# Patient Record
Sex: MALE | Race: AMERICAN INDIAN OR ALASKA NATIVE | NOT HISPANIC OR LATINO | URBAN - METROPOLITAN AREA
[De-identification: names, ages, dates, MRNs, and addresses within clinical notes are randomized per-mention and may not be internally consistent; named-entity substitution may affect disease eponyms.]

---

## 2020-07-25 ENCOUNTER — EMERGENCY (EMERGENCY)
Facility: HOSPITAL | Age: 30
LOS: 0 days | Discharge: HOME | End: 2020-07-26
Attending: EMERGENCY MEDICINE | Admitting: EMERGENCY MEDICINE
Payer: COMMERCIAL

## 2020-07-25 VITALS
DIASTOLIC BLOOD PRESSURE: 79 MMHG | TEMPERATURE: 98 F | RESPIRATION RATE: 18 BRPM | OXYGEN SATURATION: 99 % | SYSTOLIC BLOOD PRESSURE: 120 MMHG | HEART RATE: 74 BPM

## 2020-07-25 VITALS
RESPIRATION RATE: 18 BRPM | TEMPERATURE: 99 F | OXYGEN SATURATION: 100 % | DIASTOLIC BLOOD PRESSURE: 90 MMHG | HEART RATE: 71 BPM | SYSTOLIC BLOOD PRESSURE: 128 MMHG

## 2020-07-25 DIAGNOSIS — Z20.828 CONTACT WITH AND (SUSPECTED) EXPOSURE TO OTHER VIRAL COMMUNICABLE DISEASES: ICD-10-CM

## 2020-07-25 DIAGNOSIS — R10.9 UNSPECIFIED ABDOMINAL PAIN: ICD-10-CM

## 2020-07-25 DIAGNOSIS — N20.0 CALCULUS OF KIDNEY: ICD-10-CM

## 2020-07-25 LAB
ALBUMIN SERPL ELPH-MCNC: 5.3 G/DL — HIGH (ref 3.5–5.2)
ALP SERPL-CCNC: 82 U/L — SIGNIFICANT CHANGE UP (ref 30–115)
ALT FLD-CCNC: 21 U/L — SIGNIFICANT CHANGE UP (ref 0–41)
ANION GAP SERPL CALC-SCNC: 14 MMOL/L — SIGNIFICANT CHANGE UP (ref 7–14)
APPEARANCE UR: CLEAR — SIGNIFICANT CHANGE UP
AST SERPL-CCNC: 19 U/L — SIGNIFICANT CHANGE UP (ref 0–41)
BACTERIA # UR AUTO: NEGATIVE — SIGNIFICANT CHANGE UP
BASOPHILS # BLD AUTO: 0.06 K/UL — SIGNIFICANT CHANGE UP (ref 0–0.2)
BASOPHILS NFR BLD AUTO: 0.9 % — SIGNIFICANT CHANGE UP (ref 0–1)
BILIRUB SERPL-MCNC: 0.4 MG/DL — SIGNIFICANT CHANGE UP (ref 0.2–1.2)
BILIRUB UR-MCNC: NEGATIVE — SIGNIFICANT CHANGE UP
BUN SERPL-MCNC: 14 MG/DL — SIGNIFICANT CHANGE UP (ref 10–20)
CALCIUM SERPL-MCNC: 9.7 MG/DL — SIGNIFICANT CHANGE UP (ref 8.5–10.1)
CHLORIDE SERPL-SCNC: 99 MMOL/L — SIGNIFICANT CHANGE UP (ref 98–110)
CO2 SERPL-SCNC: 24 MMOL/L — SIGNIFICANT CHANGE UP (ref 17–32)
COLOR SPEC: COLORLESS — SIGNIFICANT CHANGE UP
CREAT SERPL-MCNC: 1 MG/DL — SIGNIFICANT CHANGE UP (ref 0.7–1.5)
DIFF PNL FLD: ABNORMAL
EOSINOPHIL # BLD AUTO: 0.19 K/UL — SIGNIFICANT CHANGE UP (ref 0–0.7)
EOSINOPHIL NFR BLD AUTO: 2.9 % — SIGNIFICANT CHANGE UP (ref 0–8)
EPI CELLS # UR: 0 /HPF — SIGNIFICANT CHANGE UP (ref 0–5)
GLUCOSE SERPL-MCNC: 97 MG/DL — SIGNIFICANT CHANGE UP (ref 70–99)
GLUCOSE UR QL: NEGATIVE — SIGNIFICANT CHANGE UP
HCT VFR BLD CALC: 42.7 % — SIGNIFICANT CHANGE UP (ref 42–52)
HGB BLD-MCNC: 14.9 G/DL — SIGNIFICANT CHANGE UP (ref 14–18)
HYALINE CASTS # UR AUTO: 0 /LPF — SIGNIFICANT CHANGE UP (ref 0–7)
IMM GRANULOCYTES NFR BLD AUTO: 0.5 % — HIGH (ref 0.1–0.3)
KETONES UR-MCNC: NEGATIVE — SIGNIFICANT CHANGE UP
LACTATE SERPL-SCNC: 0.9 MMOL/L — SIGNIFICANT CHANGE UP (ref 0.7–2)
LEUKOCYTE ESTERASE UR-ACNC: NEGATIVE — SIGNIFICANT CHANGE UP
LIDOCAIN IGE QN: 29 U/L — SIGNIFICANT CHANGE UP (ref 7–60)
LYMPHOCYTES # BLD AUTO: 2.99 K/UL — SIGNIFICANT CHANGE UP (ref 1.2–3.4)
LYMPHOCYTES # BLD AUTO: 45 % — SIGNIFICANT CHANGE UP (ref 20.5–51.1)
MCHC RBC-ENTMCNC: 28.4 PG — SIGNIFICANT CHANGE UP (ref 27–31)
MCHC RBC-ENTMCNC: 34.9 G/DL — SIGNIFICANT CHANGE UP (ref 32–37)
MCV RBC AUTO: 81.5 FL — SIGNIFICANT CHANGE UP (ref 80–94)
MONOCYTES # BLD AUTO: 0.47 K/UL — SIGNIFICANT CHANGE UP (ref 0.1–0.6)
MONOCYTES NFR BLD AUTO: 7.1 % — SIGNIFICANT CHANGE UP (ref 1.7–9.3)
NEUTROPHILS # BLD AUTO: 2.91 K/UL — SIGNIFICANT CHANGE UP (ref 1.4–6.5)
NEUTROPHILS NFR BLD AUTO: 43.6 % — SIGNIFICANT CHANGE UP (ref 42.2–75.2)
NITRITE UR-MCNC: NEGATIVE — SIGNIFICANT CHANGE UP
PH UR: 6 — SIGNIFICANT CHANGE UP (ref 5–8)
PLATELET # BLD AUTO: 232 K/UL — SIGNIFICANT CHANGE UP (ref 130–400)
POTASSIUM SERPL-MCNC: 4.1 MMOL/L — SIGNIFICANT CHANGE UP (ref 3.5–5)
POTASSIUM SERPL-SCNC: 4.1 MMOL/L — SIGNIFICANT CHANGE UP (ref 3.5–5)
PROT SERPL-MCNC: 7.7 G/DL — SIGNIFICANT CHANGE UP (ref 6–8)
PROT UR-MCNC: NEGATIVE — SIGNIFICANT CHANGE UP
RBC # BLD: 5.24 M/UL — SIGNIFICANT CHANGE UP (ref 4.7–6.1)
RBC # FLD: 11.8 % — SIGNIFICANT CHANGE UP (ref 11.5–14.5)
RBC CASTS # UR COMP ASSIST: 1 /HPF — SIGNIFICANT CHANGE UP (ref 0–4)
SODIUM SERPL-SCNC: 137 MMOL/L — SIGNIFICANT CHANGE UP (ref 135–146)
SP GR SPEC: 1.01 — LOW (ref 1.01–1.02)
UROBILINOGEN FLD QL: SIGNIFICANT CHANGE UP
WBC # BLD: 6.65 K/UL — SIGNIFICANT CHANGE UP (ref 4.8–10.8)
WBC # FLD AUTO: 6.65 K/UL — SIGNIFICANT CHANGE UP (ref 4.8–10.8)
WBC UR QL: 2 /HPF — SIGNIFICANT CHANGE UP (ref 0–5)

## 2020-07-25 PROCEDURE — 74177 CT ABD & PELVIS W/CONTRAST: CPT | Mod: 26

## 2020-07-25 PROCEDURE — 99283 EMERGENCY DEPT VISIT LOW MDM: CPT

## 2020-07-25 PROCEDURE — 99285 EMERGENCY DEPT VISIT HI MDM: CPT

## 2020-07-25 RX ORDER — KETOROLAC TROMETHAMINE 30 MG/ML
15 SYRINGE (ML) INJECTION ONCE
Refills: 0 | Status: DISCONTINUED | OUTPATIENT
Start: 2020-07-25 | End: 2020-07-25

## 2020-07-25 RX ORDER — TAMSULOSIN HYDROCHLORIDE 0.4 MG/1
0.4 CAPSULE ORAL AT BEDTIME
Refills: 0 | Status: DISCONTINUED | OUTPATIENT
Start: 2020-07-25 | End: 2020-07-26

## 2020-07-25 RX ORDER — SODIUM CHLORIDE 9 MG/ML
1000 INJECTION INTRAMUSCULAR; INTRAVENOUS; SUBCUTANEOUS ONCE
Refills: 0 | Status: COMPLETED | OUTPATIENT
Start: 2020-07-25 | End: 2020-07-25

## 2020-07-25 RX ORDER — SODIUM CHLORIDE 9 MG/ML
1000 INJECTION, SOLUTION INTRAVENOUS ONCE
Refills: 0 | Status: COMPLETED | OUTPATIENT
Start: 2020-07-25 | End: 2020-07-25

## 2020-07-25 RX ADMIN — SODIUM CHLORIDE 1000 MILLILITER(S): 9 INJECTION, SOLUTION INTRAVENOUS at 22:54

## 2020-07-25 RX ADMIN — Medication 15 MILLIGRAM(S): at 21:47

## 2020-07-25 RX ADMIN — TAMSULOSIN HYDROCHLORIDE 0.4 MILLIGRAM(S): 0.4 CAPSULE ORAL at 22:54

## 2020-07-25 RX ADMIN — SODIUM CHLORIDE 1000 MILLILITER(S): 9 INJECTION INTRAMUSCULAR; INTRAVENOUS; SUBCUTANEOUS at 14:51

## 2020-07-25 NOTE — ED PROVIDER NOTE - SHIFT CHANGE DETAILS
29 y/o m w/ no pmhx presented for LLQ pain x 3 months, worsened yesterday evening, no testicular pain/swelling/erythema, labs with white count of 6.68. bun/cre 14/1.0, small micro blood in urine, ct a/p showing "Obstructing left distal ureteral stone measuring 1.3 x 1.0 x 0.7 cm with associated at least moderate upstream left hydroureteronephrosis." received 1 liter fo fluids, toradol, flomax, pt comfortable at this time, urology aware of pt, pt aware of plan, will continue to monitor and reassess.

## 2020-07-25 NOTE — ED PROVIDER NOTE - PATIENT PORTAL LINK FT
You can access the FollowMyHealth Patient Portal offered by [facility] by registering at the following website: [email or web address]. By joining [employer]’s FollowMyHealth portal, you will also be able to view your health information using other applications (apps) compatible with our system.

## 2020-07-25 NOTE — ED PROVIDER NOTE - NSFOLLOWUPCLINICS_GEN_ALL_ED_FT
[facility]  Urology  .  [place]   Phone: [phone]  Fax:   Follow Up Time:

## 2020-07-25 NOTE — ED ADULT NURSE NOTE - CHPI ED NUR SYMPTOMS NEG
no vomiting/no blood in stool/no burning urination/no nausea/no dysuria/no hematuria/no fever/no abdominal distension/no diarrhea/no chills

## 2020-07-25 NOTE — ED PROVIDER NOTE - ATTENDING CONTRIBUTION TO CARE
29 yo m with no pmh, presents with LLQ pain x 3 months.  pt says worsened last night.  pt denies heavy lifting.  no urinary sx, no n/v/d, no constipation, and passing gas.  no h/o surgeries.  pt says that he f/u with his pmd for the pain in the past and was sent for an outpt US-looking for kidney stones.  pt says he was told was neg.  pt denies testicular pain or swelling.  no fever, no chills.  exam: nad, ncat, perrl, eomi, mmm, rrr, ctab, abd soft, nt,nd aox3,  exam as per shana caicedo imp: pt with possible L inguinal hernia, will check labs and CT, no concern for obstruction at this time

## 2020-07-25 NOTE — ED PROVIDER NOTE - CLINICAL SUMMARY MEDICAL DECISION MAKING FREE TEXT BOX
pt feeling better, tolerated po, abd re exam soft ntnd, no r/g, pt aware of imaging results, pain controlled, urology evaluated pt, report follow up on Monday with Urology, Dr. Barrios, possible procedure at [facility] on Tuesday, pt reports he understands, aware of signs and symptoms to return for, will give copy of results as reviewed and understood.

## 2020-07-25 NOTE — ED PROVIDER NOTE - CARE PROVIDER_API CALL
Krysta Conde  Urology  [address] [place] [address] [address] [place] [address]  Phone: [phone]  Fax: [phone]  Follow Up Time:

## 2020-07-25 NOTE — ED PROVIDER NOTE - PROGRESS NOTE DETAILS
spoke with urology - will see pt ED Attending SHERWIN Rivero Dr. evaluated pt, reports COVID swab, follow up in office on Monday, if negative will see pt at [facility] on Tuesday for possible procedure, pt aware and agrees with plan, will swab, no white count, no fever, normal bun/cre pain controlled at this time.

## 2020-07-25 NOTE — ED PROVIDER NOTE - OBJECTIVE STATEMENT
30 year old male no past medical history p/w abd pain. pain llq, no pall/prov factors, radiating to groin, x 3 months. Patient underwent mri with negative reuslts as outpatient. pain mostly resolved for 2 months but returned in greater severity last night. No fevers, chills, n/v/d, testicular pain. no heavy lifting 30 year old male no past medical history p/w abd pain. pain llq, no pall/prov factors, radiating to groin, x 3 months. Patient underwent ultrasound which with negative as per patient 2 months ago. pain mostly resolved for 2 months but returned in greater severity last night. No fevers, chills, n/v/d, testicular pain. no heavy lifting or trauma.

## 2020-07-25 NOTE — ED PROVIDER NOTE - PHYSICAL EXAMINATION
Physical Exam    Vital Signs: I have reviewed the initial vital signs  Constitutional: well-nourished, appears stated age, no acute distress  Cardiovascular: S1 and S2 present, regular rate, regular rhythm. Well perfused extremities, no peripheral edema  Respiratory: unlabored respiratory effort, clear to auscultation bilaterally no wheezing, rales or rhonchi  Gastrointestinal: soft, non-tender abdomen. No guarding or rebound tenderness  Musculoskeletal: supple nontender neck, no midline tenderness, no joint pain  Genital: Uncircumcised male, no discharge from meatus, no rash, lesions, or sores noted. + impulse on left hernia exam, small. Testes normal size, nontender, cremaster intact. No epididymal tenderness.  Integumentary: warm, dry, no rash  Psychiatric: appropriate mood, appropriate affect

## 2020-07-25 NOTE — ED ADULT NURSE NOTE - OBJECTIVE STATEMENT
Patient c/o left sided abdominal pain x 3 months patient states over the last 2 days pain increased . Denies urinary symptoms/nausea/vomiting/diarrhea/SOB/CP/fever/chills. On assessment abdomen soft/nontender/nondistended. No s/s of distress noted.

## 2020-07-26 DIAGNOSIS — N13.2 HYDRONEPHROSIS WITH RENAL AND URETERAL CALCULOUS OBSTRUCTION: ICD-10-CM

## 2020-07-26 LAB
CULTURE RESULTS: SIGNIFICANT CHANGE UP
SARS-COV-2 RNA SPEC QL NAA+PROBE: SIGNIFICANT CHANGE UP
SPECIMEN SOURCE: SIGNIFICANT CHANGE UP

## 2020-07-26 RX ORDER — KETOROLAC TROMETHAMINE 30 MG/ML
1 SYRINGE (ML) INJECTION
Qty: 5 | Refills: 0
Start: 2020-07-26 | End: 2020-07-30

## 2020-07-26 RX ORDER — TAMSULOSIN HYDROCHLORIDE 0.4 MG/1
1 CAPSULE ORAL
Qty: 5 | Refills: 0
Start: 2020-07-26 | End: 2020-07-30

## 2020-07-26 NOTE — CONSULT NOTE ADULT - SUBJECTIVE AND OBJECTIVE BOX
Patient is a 30y old  Male who presents with a chief complaint of Left sided groin pain x 3 months which has worsened over past 24hrs. Denies fever, chills, nausea or vomiting. No dysuria          PAST MEDICAL & SURGICAL HISTORY:  No pertinent past medical history  No significant past surgical history      REVIEW OF SYSTEMS:    CONSTITUTIONAL:  fevers or chills  HEENT: No visual changes  ENDO: No sweating  NECK: No pain or stiffness  MUSCULOSKELETAL: No back pain, no joint pain  RESPIRATORY: No shortness of breath  CARDIOVASCULAR: No chest pain  GASTROINTESTINAL: No abdominal or epigastric pain. No nausea, vomiting,  No diarrhea or constipation.   NEUROLOGICAL: No mental status changes  PSYCH: No depression, no mood changes  SKIN: No itching      MEDICATIONS  (STANDING):  tamsulosin 0.4 milliGRAM(s) Oral at bedtime  :      Allergies    No Known Allergies            SOCIAL HISTORY: No illicit drug use    FAMILY HISTORY:  NA    Vital Signs Last 24 Hrs  T(C): 37.2 (2020 16:52), Max: 37.2 (2020 16:52)  T(F): 98.9 (2020 16:52), Max: 98.9 (2020 16:52)  HR: 71 (2020 16:52) (71 - 74)  BP: 128/90 (2020 16:52) (120/79 - 128/90)  BP(mean): --  RR: 18 (2020 16:52) (18 - 18)  SpO2: 100% (2020 16:52) (99% - 100%)    PHYSICAL EXAM:    Constitutional: NAD, well-developed  HEENT: EOMI  Neck: no pain  Back: No CVA tenderness  Respiratory: No accessory respiratory muscle use  Abd: Soft, NT/ND  no organomegally  no hernia, mild left lower quadrant tenderness adjacent to bladder  : Normal phallus without any testicular tenderness or masses. No scrotal edema  WALKER: NA  Extremities: no edema  Neurological: A/O x 3  Psychiatric: Normal mood, normal affect  Skin: No rashes    I&O's Summary      LABS:                        14.9   6.65  )-----------( 232      ( 2020 15:09 )             42.7     07-25    137  |  99  |  14  ----------------------------<  97  4.1   |  24  |  1.0    Ca    9.7      2020 15:09    TPro  7.7  /  Alb  5.3<H>  /  TBili  0.4  /  DBili  x   /  AST  19  /  ALT  21  /  AlkPhos  82  07-25      Urinalysis Basic - ( 2020 16:33 )    Color: Colorless / Appearance: Clear / S.009 / pH: x  Gluc: x / Ketone: Negative  / Bili: Negative / Urobili: <2 mg/dL   Blood: x / Protein: Negative / Nitrite: Negative   Leuk Esterase: Negative / RBC: 1 /HPF / WBC 2 /HPF   Sq Epi: x / Non Sq Epi: 0 /HPF / Bacteria: Negative              RADIOLOGY & ADDITIONAL STUDIES:  < from: CT Abdomen and Pelvis w/ IV Cont (20 @ 20:02) >  IMPRESSION:    Obstructing left distal ureteral stone measuring 1.3 x 1.0 x 0.7 cm (1100 HU) with associated at least moderate upstream left hydroureteronephrosis.    < end of copied text > Patient is a 30y old  Male who presents with a chief complaint of Left sided groin pain x 3 months which has worsened over past 24hrs. Denies fever, chills, nausea or vomiting. No dysuria          PAST MEDICAL & SURGICAL HISTORY:  No pertinent past medical history  No significant past surgical history      REVIEW OF SYSTEMS:    CONSTITUTIONAL:  fevers or chills  HEENT: No visual changes  ENDO: No sweating  NECK: No pain or stiffness  MUSCULOSKELETAL: No back pain, no joint pain  RESPIRATORY: No shortness of breath  CARDIOVASCULAR: No chest pain  GASTROINTESTINAL: No abdominal or epigastric pain. No nausea, vomiting,  No diarrhea or constipation.   NEUROLOGICAL: No mental status changes  PSYCH: No depression, no mood changes  SKIN: No itching      MEDICATIONS  (STANDING):  tamsulosin 0.4 milliGRAM(s) Oral at bedtime  :      Allergies    No Known Allergies            SOCIAL HISTORY: No illicit drug use    FAMILY HISTORY: denies stones      Vital Signs Last 24 Hrs  T(C): 37.2 (2020 16:52), Max: 37.2 (2020 16:52)  T(F): 98.9 (2020 16:52), Max: 98.9 (2020 16:52)  HR: 71 (2020 16:52) (71 - 74)  BP: 128/90 (2020 16:52) (120/79 - 128/90)    RR: 18 (2020 16:52) (18 - 18)  SpO2: 100% (2020 16:52) (99% - 100%)    PHYSICAL EXAM:    Constitutional: NAD, well-developed  HEENT: EOMI  Neck: no pain  Back: No CVA tenderness  Respiratory: No accessory respiratory muscle use  Abd: Soft, NT/ND  no organomegally  no hernia, mild left lower quadrant tenderness adjacent to bladder  : Normal phallus without any testicular tenderness or masses. No scrotal edema  WALKER: NA  Extremities: no edema  Neurological: A/O x 3  Psychiatric: Normal mood, normal affect  Skin: No rashes    I&O's Summary      LABS:                        14.9   6.65  )-----------( 232      ( 2020 15:09 )             42.7     07-25    137  |  99  |  14  ----------------------------<  97  4.1   |  24  |  1.0    Ca    9.7      2020 15:09    TPro  7.7  /  Alb  5.3<H>  /  TBili  0.4  /  DBili  x   /  AST  19  /  ALT  21  /  AlkPhos  82  07-      Urinalysis Basic - ( 2020 16:33 )    Color: Colorless / Appearance: Clear / S.009 / pH: x  Gluc: x / Ketone: Negative  / Bili: Negative / Urobili: <2 mg/dL   Blood: x / Protein: Negative / Nitrite: Negative   Leuk Esterase: Negative / RBC: 1 /HPF / WBC 2 /HPF   Sq Epi: x / Non Sq Epi: 0 /HPF / Bacteria: Negative              RADIOLOGY & ADDITIONAL STUDIES:  < from: CT Abdomen and Pelvis w/ IV Cont (20 @ 20:02) >  IMPRESSION:    Obstructing left distal ureteral stone measuring 1.3 x 1.0 x 0.7 cm (1100 HU) with associated at least moderate upstream left hydroureteronephrosis.    < end of copied text >

## 2020-07-26 NOTE — CONSULT NOTE ADULT - PROBLEM SELECTOR RECOMMENDATION 9
Pt seen and examined by Dr Conde  Pt is pain free at this time, VSS and afebrile  Please Obtain COVID-19 test so that pt can be scheduled for outpt Laser lithotripsy.   Pt should f/u with Dr Conde on monday 7/27/20 to discuss surgical time and date

## 2020-07-26 NOTE — CONSULT NOTE ADULT - ASSESSMENT
Left distal ureteral calculus with mild hydronephrosis  No pain at this time Left distal ureteral calculus (1.3 x 1.0 x 0.7 cm (1100 HU))with mild hydronephrosis  No pain at this time    I reviewed the options with the patient. We will have a Covid test release 24 hours, he’s feeling fine and I recommended he go home come to my office Monday morning and we will schedule him for cystoscopy. If we can get up to the stone and there is no cloudy urine we will try and do ureteroscopy laser lithotripsy. If the ureter is tight horrifying cloudy urine we will put up a double J and then bring him back a week or so later. He accept the recommendations and will go home.

## 2020-07-26 NOTE — CONSULT NOTE ADULT - ATTENDING COMMENTS
I personally saw and examined the patient, reviewed the chart and available data. I discussed the situation with the patient and  as well as ER staff. I also reviewed and/or amended the note as necessary. I personally saw and examined the patient, reviewed the chart and available data. I discussed the situation with the patient and  as well as ER staff. I also reviewed and/or amended the note as necessary.    patient seen on the day in question. Note not reviewed and cosigned until now due to scheduling issues

## 2020-07-27 ENCOUNTER — APPOINTMENT (OUTPATIENT)
Dept: UROLOGY | Facility: CLINIC | Age: 30
End: 2020-07-27
Payer: COMMERCIAL

## 2020-07-27 VITALS
DIASTOLIC BLOOD PRESSURE: 88 MMHG | HEIGHT: 64 IN | TEMPERATURE: 97.9 F | WEIGHT: 124.2 LBS | HEART RATE: 75 BPM | SYSTOLIC BLOOD PRESSURE: 135 MMHG | BODY MASS INDEX: 21.21 KG/M2

## 2020-07-27 DIAGNOSIS — Z78.9 OTHER SPECIFIED HEALTH STATUS: ICD-10-CM

## 2020-07-27 DIAGNOSIS — E78.00 PURE HYPERCHOLESTEROLEMIA, UNSPECIFIED: ICD-10-CM

## 2020-07-27 PROBLEM — Z00.00 ENCOUNTER FOR PREVENTIVE HEALTH EXAMINATION: Status: ACTIVE | Noted: 2020-07-27

## 2020-07-27 PROCEDURE — 99215 OFFICE O/P EST HI 40 MIN: CPT | Mod: 25

## 2020-07-28 ENCOUNTER — APPOINTMENT (OUTPATIENT)
Dept: UROLOGY | Facility: HOSPITAL | Age: 30
End: 2020-07-28
Payer: COMMERCIAL

## 2020-07-28 ENCOUNTER — OUTPATIENT (OUTPATIENT)
Dept: OUTPATIENT SERVICES | Facility: HOSPITAL | Age: 30
LOS: 1 days | Discharge: HOME | End: 2020-07-28

## 2020-07-28 VITALS
TEMPERATURE: 99 F | DIASTOLIC BLOOD PRESSURE: 82 MMHG | SYSTOLIC BLOOD PRESSURE: 120 MMHG | OXYGEN SATURATION: 100 % | RESPIRATION RATE: 18 BRPM | HEIGHT: 64 IN | WEIGHT: 125 LBS | HEART RATE: 103 BPM

## 2020-07-28 VITALS
SYSTOLIC BLOOD PRESSURE: 113 MMHG | RESPIRATION RATE: 16 BRPM | DIASTOLIC BLOOD PRESSURE: 77 MMHG | TEMPERATURE: 97 F | OXYGEN SATURATION: 100 % | HEART RATE: 73 BPM

## 2020-07-28 PROCEDURE — 52356 CYSTO/URETERO W/LITHOTRIPSY: CPT | Mod: LT

## 2020-07-28 RX ORDER — OXYCODONE AND ACETAMINOPHEN 5; 325 MG/1; MG/1
1 TABLET ORAL ONCE
Refills: 0 | Status: DISCONTINUED | OUTPATIENT
Start: 2020-07-28 | End: 2020-07-28

## 2020-07-28 RX ORDER — ONDANSETRON 8 MG/1
4 TABLET, FILM COATED ORAL ONCE
Refills: 0 | Status: DISCONTINUED | OUTPATIENT
Start: 2020-07-28 | End: 2020-08-12

## 2020-07-28 RX ORDER — MEPERIDINE HYDROCHLORIDE 50 MG/ML
12.5 INJECTION INTRAMUSCULAR; INTRAVENOUS; SUBCUTANEOUS ONCE
Refills: 0 | Status: DISCONTINUED | OUTPATIENT
Start: 2020-07-28 | End: 2020-07-28

## 2020-07-28 RX ORDER — MORPHINE SULFATE 50 MG/1
2 CAPSULE, EXTENDED RELEASE ORAL
Refills: 0 | Status: DISCONTINUED | OUTPATIENT
Start: 2020-07-28 | End: 2020-07-28

## 2020-07-28 RX ORDER — SODIUM CHLORIDE 9 MG/ML
1000 INJECTION, SOLUTION INTRAVENOUS
Refills: 0 | Status: DISCONTINUED | OUTPATIENT
Start: 2020-07-28 | End: 2020-08-12

## 2020-07-28 RX ORDER — MORPHINE SULFATE 50 MG/1
4 CAPSULE, EXTENDED RELEASE ORAL
Refills: 0 | Status: DISCONTINUED | OUTPATIENT
Start: 2020-07-28 | End: 2020-07-28

## 2020-07-28 RX ADMIN — OXYCODONE AND ACETAMINOPHEN 1 TABLET(S): 5; 325 TABLET ORAL at 20:00

## 2020-07-28 RX ADMIN — MORPHINE SULFATE 4 MILLIGRAM(S): 50 CAPSULE, EXTENDED RELEASE ORAL at 19:35

## 2020-07-28 RX ADMIN — MORPHINE SULFATE 2 MILLIGRAM(S): 50 CAPSULE, EXTENDED RELEASE ORAL at 19:10

## 2020-07-28 NOTE — HISTORY OF PRESENT ILLNESS
[Lower Abdomen] : lower abdomen [2] : 2 [None] : There is no radiation [FreeTextEntry1] : Sharath is a 30-year-old man from [facility] there was seen in the ER over the weekend with a left ureteral stone that he thinks has been there since March 2020. He had some big abdominal pain and an MRI and sono were done by his PCP and they were none diagnostic. On Saturday he developed some increased dysethesia on the left side the CAT scan was positive for 13 x 10 x 7 mm stone I saw him he was comfortable he went home to me today to discuss his options. [de-identified] : discomfort right SI joint

## 2020-07-28 NOTE — ASSESSMENT
[FreeTextEntry1] : He has a large rather dense left distal ureter stone and for what he said is been there at least three months. I discussed within the options I don’t think shockwave lithotripsy will be of value. We will try and get up to work with the ureter scope hoping that will we bypass it with the wire we don’t get cloudy flecks. If we can get up to in the urine is clear will try to break it up with the laser beam take out the pieces only the double J. If we are the cake it up to it – the ureter is tight, or your is cloudy will put up a double J and come back another day. The risk benefits and alternatives were discussed at length in all questions were answered

## 2020-07-28 NOTE — PHYSICAL EXAM
[General Appearance - Well Developed] : well developed [Well Groomed] : well groomed [General Appearance - Well Nourished] : well nourished [Normal Appearance] : normal appearance [Abdomen Soft] : soft [General Appearance - In No Acute Distress] : no acute distress [Costovertebral Angle Tenderness] : no ~M costovertebral angle tenderness [Abdomen Tenderness] : non-tender [Urethral Meatus] : meatus normal [Penis Abnormality] : normal uncircumcised penis [Testes Tenderness] : no tenderness of the testes [Edema] : no peripheral edema [Respiration, Rhythm And Depth] : normal respiratory rhythm and effort [] : no respiratory distress [Exaggerated Use Of Accessory Muscles For Inspiration] : no accessory muscle use [Oriented To Time, Place, And Person] : oriented to person, place, and time [Mood] : the mood was normal [Not Anxious] : not anxious [Affect] : the affect was normal [Normal Station and Gait] : the gait and station were normal for the patient's age [No Focal Deficits] : no focal deficits [FreeTextEntry1] : (from ER saturday night)

## 2020-07-28 NOTE — REVIEW OF SYSTEMS
[Negative] : Endocrine [Joint Pain] : joint pain [FreeTextEntry1] : right si joint unrelated to current issues

## 2020-07-28 NOTE — LETTER HEADER
[FreeTextEntry3] : Krysta Conde M.D.\par Director of Urology\par [facility]/[place]\par [address] [place] [address], [facility] [address]\[place] [address]\par

## 2020-07-28 NOTE — ASU DISCHARGE PLAN (ADULT/PEDIATRIC) - CALL YOUR DOCTOR IF YOU HAVE ANY OF THE FOLLOWING:
Increased irritability or sluggishness/Bleeding that does not stop/Pain not relieved by Medications/Fever greater than (need to indicate Fahrenheit or Celsius)/Nausea and vomiting that does not stop/Inability to tolerate liquids or foods

## 2020-07-28 NOTE — ASU DISCHARGE PLAN (ADULT/PEDIATRIC) - CARE PROVIDER_API CALL
Krysta Conde  Urology  [address] [place] [address] [address] [place] [address]  Phone: [phone]  Fax: [phone]  Established Patient  Follow Up Time:

## 2020-07-28 NOTE — CHART NOTE - NSCHARTNOTEFT_GEN_A_CORE
PACU ANESTHESIA ADMISSION NOTE      Procedure: Insertion, stent, ureter, cystoscopic    Post op diagnosis:  Left ureteral stone  Pyonephrosis      ____  Intubated  TV:______       Rate: ______      FiO2: ______    _x___  Patent Airway    _x___  Full return of protective reflexes    _x___  Full recovery from anesthesia / back to baseline status    Vitals:  T(C): 37.2  HR 75  BP:103/66   RR: 18   SpO2: 100%(    Mental Status:  _x___ Awake   _____ Alert   _____ Drowsy   _____ Sedated    Nausea/Vomiting:  _x___  NO       ______Yes,   See Post - Op Orders         Pain Scale (0-10):  __0___    Treatment: _x___ None    ____ See Post - Op/PCA Orders    Post - Operative Fluids:   __x__ Oral   ____ See Post - Op Orders    Plan: Discharge:   __x __Home       _____Floor     _____Critical Care    _____  Other:_________________    Comments:  No anesthesia issues or complications noted.  Discharge when criteria met.

## 2020-07-28 NOTE — BRIEF OPERATIVE NOTE - NSICDXBRIEFPREOP_GEN_ALL_CORE_FT
PRE-OP DIAGNOSIS:  Left ureteral stone 28-Jul-2020 18:59:39  Krysta Conde  Pyonephrosis 28-Jul-2020 18:59:33  Krysta Conde PRE-OP DIAGNOSIS:  Left ureteral stone 28-Jul-2020 18:59:39  Krysta Conde

## 2020-07-28 NOTE — BRIEF OPERATIVE NOTE - NSICDXBRIEFPOSTOP_GEN_ALL_CORE_FT
POST-OP DIAGNOSIS:  Left ureteral stone 28-Jul-2020 19:00:25  Krysta Conde  Pyonephrosis 28-Jul-2020 19:00:15  Krysta Conde

## 2020-07-29 ENCOUNTER — APPOINTMENT (OUTPATIENT)
Dept: UROLOGY | Facility: CLINIC | Age: 30
End: 2020-07-29
Payer: COMMERCIAL

## 2020-07-29 VITALS
DIASTOLIC BLOOD PRESSURE: 71 MMHG | SYSTOLIC BLOOD PRESSURE: 116 MMHG | HEIGHT: 64 IN | TEMPERATURE: 99.6 F | BODY MASS INDEX: 21.17 KG/M2 | WEIGHT: 124 LBS | HEART RATE: 93 BPM

## 2020-07-29 PROBLEM — E78.00 PURE HYPERCHOLESTEROLEMIA, UNSPECIFIED: Chronic | Status: ACTIVE | Noted: 2020-07-28

## 2020-07-29 LAB
GRAM STN FLD: SIGNIFICANT CHANGE UP
SPECIMEN SOURCE: SIGNIFICANT CHANGE UP

## 2020-07-29 PROCEDURE — 99214 OFFICE O/P EST MOD 30 MIN: CPT

## 2020-07-30 LAB
CULTURE RESULTS: NO GROWTH — SIGNIFICANT CHANGE UP
SPECIMEN SOURCE: SIGNIFICANT CHANGE UP

## 2020-07-30 NOTE — REVIEW OF SYSTEMS
[see HPI] : see HPI [Joint Pain] : joint pain [Negative] : Heme/Lymph [Fever] : no fever [Chills] : no chills [FreeTextEntry1] : right si joint unrelated to current issues

## 2020-07-30 NOTE — ADDENDUM
[FreeTextEntry1] : July 30, 2020 the culture came back as no growth, While the Gram stain showed few white cells no bacteria seen\par

## 2020-07-30 NOTE — HISTORY OF PRESENT ILLNESS
[6] : 6 [Lower Abdomen] : lower abdomen [None] : There is no radiation [Constant] : constantly [Dysuria] : dysuria [Left Flank] : left flank [Abdominal Pain] : abdominal pain [de-identified] : since post op [FreeTextEntry1] : Sharath went to the OR last night hoping to get ureteroscopic removal of left ureteral stone. However the anatomy was way too tight and we also data cloudy urine so we put up a double J in because of the cloudy urine I wanted to promote distal flow I left him with a Zhang. He went home with the catheter. He comes in this morning telling me that he’s in agony from the a catheter. He is having a lot of pain in the tip of his penis in his belly just hurts terribly.\par  He said no fever and the urine has been clear.\par  [de-identified] : alejandro out

## 2020-07-30 NOTE — LETTER HEADER
[FreeTextEntry3] : Krysta Conde M.D.\par Director of Urology\par [facility]/[place]\par [address] [place] [address], [facility] [address]\[place] [address]\par

## 2020-07-30 NOTE — ASSESSMENT
[FreeTextEntry1] : Some of this is just the ureter is irritated by the catheter though I did put in a 14 [place] which is really the smallest identity using adults. However he is a small person, the ureter was quite tight let alone the stone being impacted making it even worse. So we could be having some stent colic. I took out the catheter, he felt a lot better but still uncomfortable and unfortunately will have to live with that. He will stay on the Flomax and we are putting him on for a second attempt for next Tuesday.

## 2020-07-30 NOTE — PHYSICAL EXAM
[General Appearance - Well Developed] : well developed [General Appearance - Well Nourished] : well nourished [Normal Appearance] : normal appearance [Well Groomed] : well groomed [Abdomen Soft] : soft [Costovertebral Angle Tenderness] : no ~M costovertebral angle tenderness [Abdomen Tenderness] : non-tender [Urethral Meatus] : meatus normal [Penis Abnormality] : normal uncircumcised penis [Epididymis] : the epididymides were normal [Testes Tenderness] : no tenderness of the testes [Heart Rate And Rhythm] : Heart rate and rhythm were normal [Edema] : no peripheral edema [] : no respiratory distress [Respiration, Rhythm And Depth] : normal respiratory rhythm and effort [Exaggerated Use Of Accessory Muscles For Inspiration] : no accessory muscle use [Auscultation Breath Sounds / Voice Sounds] : lungs were clear to auscultation bilaterally [Oriented To Time, Place, And Person] : oriented to person, place, and time [Affect] : the affect was normal [Mood] : the mood was normal [FreeTextEntry1] : very anxious

## 2020-07-31 DIAGNOSIS — N13.6 PYONEPHROSIS: ICD-10-CM

## 2020-07-31 DIAGNOSIS — E78.00 PURE HYPERCHOLESTEROLEMIA, UNSPECIFIED: ICD-10-CM

## 2020-07-31 DIAGNOSIS — N20.1 CALCULUS OF URETER: ICD-10-CM

## 2020-08-01 ENCOUNTER — OUTPATIENT (OUTPATIENT)
Dept: OUTPATIENT SERVICES | Facility: HOSPITAL | Age: 30
LOS: 1 days | Discharge: HOME | End: 2020-08-01

## 2020-08-01 ENCOUNTER — LABORATORY RESULT (OUTPATIENT)
Age: 30
End: 2020-08-01

## 2020-08-01 DIAGNOSIS — Z11.59 ENCOUNTER FOR SCREENING FOR OTHER VIRAL DISEASES: ICD-10-CM

## 2020-08-04 ENCOUNTER — APPOINTMENT (OUTPATIENT)
Dept: UROLOGY | Facility: HOSPITAL | Age: 30
End: 2020-08-04

## 2020-08-04 ENCOUNTER — OUTPATIENT (OUTPATIENT)
Dept: OUTPATIENT SERVICES | Facility: HOSPITAL | Age: 30
LOS: 1 days | Discharge: HOME | End: 2020-08-04
Payer: COMMERCIAL

## 2020-08-04 VITALS
TEMPERATURE: 99 F | HEIGHT: 64 IN | HEART RATE: 79 BPM | WEIGHT: 125 LBS | SYSTOLIC BLOOD PRESSURE: 115 MMHG | OXYGEN SATURATION: 100 % | DIASTOLIC BLOOD PRESSURE: 68 MMHG | RESPIRATION RATE: 20 BRPM

## 2020-08-04 VITALS
SYSTOLIC BLOOD PRESSURE: 127 MMHG | DIASTOLIC BLOOD PRESSURE: 82 MMHG | RESPIRATION RATE: 18 BRPM | OXYGEN SATURATION: 100 % | HEART RATE: 86 BPM

## 2020-08-04 DIAGNOSIS — N13.6 PYONEPHROSIS: ICD-10-CM

## 2020-08-04 PROCEDURE — 52356 CYSTO/URETERO W/LITHOTRIPSY: CPT | Mod: LT

## 2020-08-04 RX ORDER — SODIUM CHLORIDE 9 MG/ML
1000 INJECTION, SOLUTION INTRAVENOUS
Refills: 0 | Status: DISCONTINUED | OUTPATIENT
Start: 2020-08-04 | End: 2020-08-05

## 2020-08-04 RX ORDER — HYDROMORPHONE HYDROCHLORIDE 2 MG/ML
0.5 INJECTION INTRAMUSCULAR; INTRAVENOUS; SUBCUTANEOUS
Refills: 0 | Status: DISCONTINUED | OUTPATIENT
Start: 2020-08-04 | End: 2020-08-05

## 2020-08-04 RX ORDER — OXYCODONE AND ACETAMINOPHEN 5; 325 MG/1; MG/1
1 TABLET ORAL ONCE
Refills: 0 | Status: DISCONTINUED | OUTPATIENT
Start: 2020-08-04 | End: 2020-08-04

## 2020-08-04 RX ORDER — ONDANSETRON 8 MG/1
4 TABLET, FILM COATED ORAL ONCE
Refills: 0 | Status: DISCONTINUED | OUTPATIENT
Start: 2020-08-04 | End: 2020-08-05

## 2020-08-04 RX ORDER — PHENAZOPYRIDINE HCL 100 MG
100 TABLET ORAL ONCE
Refills: 0 | Status: COMPLETED | OUTPATIENT
Start: 2020-08-04 | End: 2020-08-04

## 2020-08-04 RX ADMIN — OXYCODONE AND ACETAMINOPHEN 1 TABLET(S): 5; 325 TABLET ORAL at 20:41

## 2020-08-04 RX ADMIN — SODIUM CHLORIDE 75 MILLILITER(S): 9 INJECTION, SOLUTION INTRAVENOUS at 20:20

## 2020-08-04 RX ADMIN — Medication 100 MILLIGRAM(S): at 21:10

## 2020-08-04 RX ADMIN — HYDROMORPHONE HYDROCHLORIDE 0.5 MILLIGRAM(S): 2 INJECTION INTRAMUSCULAR; INTRAVENOUS; SUBCUTANEOUS at 20:20

## 2020-08-04 RX ADMIN — HYDROMORPHONE HYDROCHLORIDE 0.5 MILLIGRAM(S): 2 INJECTION INTRAMUSCULAR; INTRAVENOUS; SUBCUTANEOUS at 20:42

## 2020-08-04 RX ADMIN — HYDROMORPHONE HYDROCHLORIDE 0.5 MILLIGRAM(S): 2 INJECTION INTRAMUSCULAR; INTRAVENOUS; SUBCUTANEOUS at 20:30

## 2020-08-04 NOTE — BRIEF OPERATIVE NOTE - OPERATION/FINDINGS
impacted stone broken with laser fragmetns removed and dropped in the bladder . I did not see them in the bladder or drain so we will strain his urine

## 2020-08-04 NOTE — BRIEF OPERATIVE NOTE - NSICDXBRIEFPROCEDURE_GEN_ALL_CORE_FT
PROCEDURES:  Ureteroscopy with laser lithotripsy and stent placement 04-Aug-2020 20:07:22 left Krysta Conde

## 2020-08-04 NOTE — ASU DISCHARGE PLAN (ADULT/PEDIATRIC) - CALL YOUR DOCTOR IF YOU HAVE ANY OF THE FOLLOWING:
Nausea and vomiting that does not stop/Fever greater than (need to indicate Fahrenheit or Celsius)/Inability to tolerate liquids or foods/Increased irritability or sluggishness/Bleeding that does not stop

## 2020-08-04 NOTE — ASU DISCHARGE PLAN (ADULT/PEDIATRIC) - CARE PROVIDER_API CALL
Krysta Conde  Urology  [address] [place] [address] [address] [place] [address]  Phone: [phone]  Fax: [phone]  Established Patient  Scheduled Appointment: 08/06/2020 09:00 AM

## 2020-08-04 NOTE — ASU DISCHARGE PLAN (ADULT/PEDIATRIC) - PROVIDER TOKENS
PROVIDER:[TOKEN:[[id number]],SCHEDULEDAPPT:[08/06/2020],SCHEDULEDAPPTTIME:[09:00 AM],ESTABLISHEDPATIENT:[T]]

## 2020-08-04 NOTE — ASU PATIENT PROFILE, ADULT - TEACHING/LEARNING CULTURAL CONSIDERATIONS
none 67 y/o male with PMH NICM HFrEF s/p HM2 LVAD 6/2017, who underwent heart transplant on 2/23/18 (CMV D-/R+ and Toxo D-/R+, Hep C+ heart) with post op graft dysfunction who underwent plasmapheresis and IVIG x2 as well as rituximab, with suspected fungal PNA diagnosed 6/2018  treated w voriconazole, recurrent PNA with new RUL infiltrate on CT scan 8/18, which  improved on broad spectrum antibiotics. S/p lung biopsy 8/18. C diff toxin positive -still  on oral vanco,.  Patient presents today for RHC and cardiac biopsy with c/o ongoing diarrhea (3-4 loose BM daily) and mildly productive cough, denies fevers, chills, abd pain, N/V. EKG demonstrated ST at 133bpm. Pt readmitted for further workup.  9/6 one formed BM this am; CMV study's pending, BC pending afebrile, albumin 500 cc for dehydration  potassium supplemented, transition to regular diet.   9/7 VVS; underwent IR procedure of Right lung mass FNA.    9/8 + fever x1 101.7 as per ID cefepine decreased to 2g bid ivpb  9/9 VSS. 1 liquid/soft BM. Heme consulted for neutropenia possible bone marrow Bx. Deferring Bx at this time until anemia work up complete. Tacro 11.8. 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  Trying to work out antibiotic coverage with insurance  9/20  Hd stable.  Tacro 5.3.  Per HF increase to 2mg in pm.  Insurance approved antbx.  CRUZ sent to facilities.  Awaiting acceptance.    9/21 HD stable.  Tacro 3.6.  Will await HF recommendation for tacrolimus dose.  Antbx 6 months total 67 y/o male with PMH NICM HFrEF s/p HM2 LVAD 6/2017, who underwent heart transplant on 2/23/18 (CMV D-/R+ and Toxo D-/R+, Hep C+ heart) with post op graft dysfunction who underwent plasmapheresis and IVIG x2 as well as rituximab, with suspected fungal PNA diagnosed 6/2018  treated w voriconazole, recurrent PNA with new RUL infiltrate on CT scan 8/18, which  improved on broad spectrum antibiotics. S/p lung biopsy 8/18. C diff toxin positive -still  on oral vanco,.  Patient presents today for RHC and cardiac biopsy with c/o ongoing diarrhea (3-4 loose BM daily) and mildly productive cough, denies fevers, chills, abd pain, N/V. EKG demonstrated ST at 133bpm. Pt readmitted for further workup.  9/6 one formed BM this am; CMV study's pending, BC pending afebrile, albumin 500 cc for dehydration  potassium supplemented, transition to regular diet.   9/7 VVS; underwent IR procedure of Right lung mass FNA.  Tolerated procedure well.  Culture pending. Vanco IV D/C per ID. Continue with current medication regimen. Send Legionella urine culture and check voriconazole (T) in AM   Disposition: Home once medically cleared   9/8 + fever x1 101.7 as per ID cefepine decreased to 2g bid ivpb  9/9 VSS. 1 liquid/soft BM. Heme consulted for neutropenia possible bone marrow Bx. Deferring Bx at this time until anemia work up complete. Tacro 11.8. 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 Lujan  pt to need potential off 6 months abx for Nocardia in lung + right antecubital midline  9/18 VSS OOb to chair, hopeful d.c home today with IV  abx  9/19  HD stable. Trying to work out antibiotic coverage with insurance  9/20  HD stable.  Tacro 5.3.  Per HF increase to 2mg in pm.  Insurance approved antbx.  CRUZ sent to facilities.  Awaiting acceptance.    9/22 tacro 4  3mg am and 3mg pm, off bactrim  due to hyperkalemia, mepron restarted  9/23 VSS, tacro level 4.7 Tacro dose 3mg BID.   9/24 VVS; Continue with current medication regimen.    9/25 VSS, Tacro level 6.5. Pending bed availability at [facility] for discharge. 67 y/o male with PMH NICM HFrEF s/p HM2 LVAD 6/2017, who underwent heart transplant on 2/23/18 (CMV D-/R+ and Toxo D-/R+, Hep C+ heart) with post op graft dysfunction who underwent plasmapheresis and IVIG x2 as well as rituximab, with suspected fungal PNA diagnosed 6/2018  treated w voriconazole, recurrent PNA with new RUL infiltrate on CT scan 8/18, which  improved on broad spectrum antibiotics. S/p lung biopsy 8/18. C diff toxin positive -still  on oral vanco,.  Patient presents today for RHC and cardiac biopsy with c/o ongoing diarrhea (3-4 loose BM daily) and mildly productive cough, denies fevers, chills, abd pain, N/V. EKG demonstrated ST at 133bpm. Pt readmitted for further workup.  9/6 one formed BM this am; CMV study's pending, BC pending afebrile, albumin 500 cc for dehydration  potassium supplemented, transition to regular diet.   9/7 VVS; underwent IR procedure of Right lung mass FNA.  Tolerated procedure well.  Culture pending. Vanco IV D/C per ID. Continue with current medication regimen. Send Legionella urine culture and check voriconazole (T) in AM   Disposition: Home once medically cleared   9/8 + fever x1 101.7 as per ID cefepine decreased to 2g bid ivpb  9/9 VSS. 1 liquid/soft BM. Heme consulted for neutropenia possible bone marrow Bx. Deferring Bx at this time until anemia work up complete. Tacro 11.8. 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 Lujan  pt to need potential off 6 months abx for Nocardia in lung + right antecubital midline  9/18 VSS OOb to chair, hopeful d.c home today with IV  abx  9/19  HD stable. Trying to work out antibiotic coverage with insurance  9/20  HD stable.  Tacro 5.3.  Per HF increase to 2mg in pm.  Insurance approved antbx.  CRUZ sent to facilities.  Awaiting acceptance.    9/22 tacro 4  3mg am and 3mg pm, off bactrim  due to hyperkalemia, mepron restarted  9/23 VSS, tacro level 4.7 Tacro dose 3mg BID.   9/24 VVS; Continue with current medication regimen.    9/25 VSS, Tacro level 6.5. Pending bed availability at [facility] for discharge.  9/26 VSS, Discharged to [facility] today.

## 2020-08-05 ENCOUNTER — OUTPATIENT (OUTPATIENT)
Dept: OUTPATIENT SERVICES | Facility: HOSPITAL | Age: 30
LOS: 1 days | Discharge: HOME | End: 2020-08-05
Payer: COMMERCIAL

## 2020-08-05 ENCOUNTER — APPOINTMENT (OUTPATIENT)
Dept: UROLOGY | Facility: CLINIC | Age: 30
End: 2020-08-05
Payer: COMMERCIAL

## 2020-08-05 VITALS
HEART RATE: 90 BPM | DIASTOLIC BLOOD PRESSURE: 66 MMHG | BODY MASS INDEX: 20.83 KG/M2 | HEIGHT: 64 IN | SYSTOLIC BLOOD PRESSURE: 114 MMHG | WEIGHT: 122 LBS | TEMPERATURE: 99 F

## 2020-08-05 DIAGNOSIS — N20.1 CALCULUS OF URETER: ICD-10-CM

## 2020-08-05 DIAGNOSIS — N20.0 CALCULUS OF KIDNEY: ICD-10-CM

## 2020-08-05 DIAGNOSIS — R10.9 UNSPECIFIED ABDOMINAL PAIN: ICD-10-CM

## 2020-08-05 PROCEDURE — 74019 RADEX ABDOMEN 2 VIEWS: CPT | Mod: 26

## 2020-08-05 PROCEDURE — 99213 OFFICE O/P EST LOW 20 MIN: CPT

## 2020-08-06 ENCOUNTER — APPOINTMENT (OUTPATIENT)
Dept: UROLOGY | Facility: CLINIC | Age: 30
End: 2020-08-06
Payer: COMMERCIAL

## 2020-08-06 VITALS
HEART RATE: 107 BPM | DIASTOLIC BLOOD PRESSURE: 68 MMHG | SYSTOLIC BLOOD PRESSURE: 126 MMHG | TEMPERATURE: 98.2 F | HEIGHT: 64 IN

## 2020-08-06 VITALS — WEIGHT: 118 LBS | BODY MASS INDEX: 20.25 KG/M2

## 2020-08-06 DIAGNOSIS — N20.0 CALCULUS OF KIDNEY: ICD-10-CM

## 2020-08-06 PROCEDURE — 99213 OFFICE O/P EST LOW 20 MIN: CPT

## 2020-08-06 NOTE — LETTER HEADER
[FreeTextEntry3] : Krysta Conde M.D.\par Director of Urology\par [facility]\par [address] [place] [address], [facility] [address]\[place] [address]\par

## 2020-08-06 NOTE — HISTORY OF PRESENT ILLNESS
[2] : 2 [FreeTextEntry1] : Sharath 30-year-old male status post ureteroscopy with laser lithotripsy on 8/4/20. He presents today for stent removal. He brought stone fragments for analysis.

## 2020-08-06 NOTE — LETTER HEADER
[FreeTextEntry3] : Krysta Conde M.D.\par Director of Urology\par [facility]\par [address] [place] [address], [facility] [address]\[place] [address]\par

## 2020-08-06 NOTE — ASSESSMENT
[FreeTextEntry1] : Stent was removed without complication. Patient will have metabolic workup x2 in 6 weeks and follow up in 10 weeks with ultrasound for review.He understands that He will have some dysuria for the next day or two. If he starts to have fewer trouble voiding and especially if the discomfort doesn't get better over the weekend he was told to call and come in on Monday

## 2020-08-06 NOTE — PHYSICAL EXAM
[General Appearance - Well Developed] : well developed [General Appearance - Well Nourished] : well nourished [Well Groomed] : well groomed [Normal Appearance] : normal appearance [Costovertebral Angle Tenderness] : no ~M costovertebral angle tenderness [Abdomen Tenderness] : non-tender [Abdomen Soft] : soft [Edema] : no peripheral edema [] : no respiratory distress [Respiration, Rhythm And Depth] : normal respiratory rhythm and effort [Exaggerated Use Of Accessory Muscles For Inspiration] : no accessory muscle use [Oriented To Time, Place, And Person] : oriented to person, place, and time [Affect] : the affect was normal [Mood] : the mood was normal [Not Anxious] : not anxious [No Focal Deficits] : no focal deficits [Normal Station and Gait] : the gait and station were normal for the patient's age [Urethral Meatus] : meatus normal [Penis Abnormality] : normal uncircumcised penis [Epididymis] : the epididymides were normal [Testes Tenderness] : no tenderness of the testes [FreeTextEntry1] : Stent was removed intact

## 2020-08-06 NOTE — LETTER BODY
[Courtesy Letter:] : I had the pleasure of seeing your patient, [unfilled], in my office today. [Please see my note below.] : Please see my note below. [Dear  ___] : Dear  [unfilled], [Sincerely,] : Sincerely, [FreeTextEntry2] : Oleksandr Ratliff MD\par [address]\[place] [address]\par

## 2020-08-06 NOTE — LETTER BODY
[Dear  ___] : Dear  [unfilled], [Courtesy Letter:] : I had the pleasure of seeing your patient, [unfilled], in my office today. [Sincerely,] : Sincerely, [Please see my note below.] : Please see my note below. [FreeTextEntry2] : Oleksandr Ratliff MD\par [address]\[place] [address]\par

## 2020-08-06 NOTE — PHYSICAL EXAM
[General Appearance - Well Developed] : well developed [General Appearance - Well Nourished] : well nourished [Normal Appearance] : normal appearance [Well Groomed] : well groomed [Abdomen Soft] : soft [Abdomen Tenderness] : non-tender [Costovertebral Angle Tenderness] : no ~M costovertebral angle tenderness [Heart Rate And Rhythm] : Heart rate and rhythm were normal [Edema] : no peripheral edema [] : no respiratory distress [Respiration, Rhythm And Depth] : normal respiratory rhythm and effort [Exaggerated Use Of Accessory Muscles For Inspiration] : no accessory muscle use [Auscultation Breath Sounds / Voice Sounds] : lungs were clear to auscultation bilaterally [Oriented To Time, Place, And Person] : oriented to person, place, and time [Affect] : the affect was normal [Mood] : the mood was normal [FreeTextEntry1] : Walking slowly and concerned re-pain

## 2020-08-06 NOTE — HISTORY OF PRESENT ILLNESS
[Dysuria] : dysuria [Abdominal Pain] : abdominal pain [FreeTextEntry1] : This young man had left extended ureteroscopy for a rather large distal ureteral stone done on Tuesday. Because of the amount of work I left a double J that planned on taking it out on Thursday. He was asked to strain his urine he told me that he did found pieces that through most of them output he thinks he has a few pieces left at home. He came in today because he was just having so much pain.

## 2020-08-06 NOTE — ASSESSMENT
[FreeTextEntry1] : The KUB shows no residual stone that show a plain film. There was no evidence of infection I think this is just stent colic coupled with his anxiety. We will see him tomorrow so want to leave the stent in one more day is there was a lot of irritation of the ureter from the numerous passes as well as the large size of the stone which may end up causing damage to the ureter just from the impaction. We will only know that over time detailed exam

## 2020-08-07 DIAGNOSIS — N20.1 CALCULUS OF URETER: ICD-10-CM

## 2020-08-13 LAB — NIDUS STONE QN: NORMAL

## 2020-08-27 LAB
APPEARANCE: CLEAR
BACTERIA UR CULT: NORMAL
BILIRUBIN URINE: NEGATIVE
BLOOD URINE: NEGATIVE
COLOR: COLORLESS
GLUCOSE QUALITATIVE U: NEGATIVE
KETONES URINE: NEGATIVE
LEUKOCYTE ESTERASE URINE: NEGATIVE
NITRITE URINE: NEGATIVE
PH URINE: 6.5
PROTEIN URINE: NEGATIVE
SPECIFIC GRAVITY URINE: 1
UROBILINOGEN URINE: NORMAL

## 2020-09-03 ENCOUNTER — OUTPATIENT (OUTPATIENT)
Dept: OUTPATIENT SERVICES | Facility: HOSPITAL | Age: 30
LOS: 1 days | Discharge: HOME | End: 2020-09-03
Payer: COMMERCIAL

## 2020-09-03 DIAGNOSIS — N20.1 CALCULUS OF URETER: ICD-10-CM

## 2020-09-03 PROCEDURE — 76775 US EXAM ABDO BACK WALL LIM: CPT | Mod: 26

## 2020-11-20 ENCOUNTER — APPOINTMENT (OUTPATIENT)
Dept: UROLOGY | Facility: CLINIC | Age: 30
End: 2020-11-20
Payer: COMMERCIAL

## 2020-11-20 VITALS
WEIGHT: 118 LBS | HEART RATE: 82 BPM | HEIGHT: 64 IN | BODY MASS INDEX: 20.14 KG/M2 | SYSTOLIC BLOOD PRESSURE: 106 MMHG | DIASTOLIC BLOOD PRESSURE: 64 MMHG | TEMPERATURE: 98.9 F

## 2020-11-20 DIAGNOSIS — Z87.448 PERSONAL HISTORY OF OTHER DISEASES OF URINARY SYSTEM: ICD-10-CM

## 2020-11-20 DIAGNOSIS — R10.84 GENERALIZED ABDOMINAL PAIN: ICD-10-CM

## 2020-11-20 DIAGNOSIS — Z87.898 PERSONAL HISTORY OF OTHER SPECIFIED CONDITIONS: ICD-10-CM

## 2020-11-20 PROCEDURE — 99213 OFFICE O/P EST LOW 20 MIN: CPT

## 2020-11-20 RX ORDER — CEFPODOXIME PROXETIL 200 MG/1
200 TABLET, FILM COATED ORAL
Qty: 14 | Refills: 0 | Status: COMPLETED | COMMUNITY
Start: 2020-07-28 | End: 2020-11-20

## 2020-11-20 RX ORDER — PHENAZOPYRIDINE HYDROCHLORIDE 100 MG/1
100 TABLET ORAL
Qty: 6 | Refills: 0 | Status: COMPLETED | COMMUNITY
Start: 2020-08-05 | End: 2020-11-20

## 2020-11-20 RX ORDER — TRAMADOL HYDROCHLORIDE 50 MG/1
50 TABLET, COATED ORAL
Qty: 12 | Refills: 0 | Status: COMPLETED | COMMUNITY
Start: 2020-07-28 | End: 2020-11-20

## 2020-11-20 RX ORDER — KETOROLAC TROMETHAMINE 10 MG/1
10 TABLET, FILM COATED ORAL
Qty: 4 | Refills: 0 | Status: COMPLETED | COMMUNITY
End: 2020-11-20

## 2020-11-20 RX ORDER — TAMSULOSIN HYDROCHLORIDE 0.4 MG/1
0.4 CAPSULE ORAL
Qty: 30 | Refills: 0 | Status: COMPLETED | COMMUNITY
End: 2020-11-20

## 2020-11-20 NOTE — PHYSICAL EXAM
[General Appearance - Well Developed] : well developed [General Appearance - Well Nourished] : well nourished [Normal Appearance] : normal appearance [Well Groomed] : well groomed [General Appearance - In No Acute Distress] : no acute distress [Abdomen Soft] : soft [Abdomen Tenderness] : non-tender [Costovertebral Angle Tenderness] : no ~M costovertebral angle tenderness [] : no respiratory distress [Respiration, Rhythm And Depth] : normal respiratory rhythm and effort [Exaggerated Use Of Accessory Muscles For Inspiration] : no accessory muscle use [Oriented To Time, Place, And Person] : oriented to person, place, and time [Affect] : the affect was normal [Mood] : the mood was normal [Not Anxious] : not anxious

## 2020-11-22 NOTE — ASSESSMENT
[FreeTextEntry1] : His calcium, salt or high with his citrate level undetectable which gives him an increase risk for future stones though his current PO intake is high enough to obviate that. The question is will he maintain this intake and most people don’t.\par \par I spoke to about this gave him some brochures and how to adjust his diet to lower the elevated and to raise the low values and we will need to see how he does with three testing. If this does not work just by my guiding him he may need to see a nutritionist and/or nephrologist

## 2020-11-22 NOTE — LETTER HEADER
[FreeTextEntry3] : Krysta Conde M.D.\par Director of Urology\par [facility]\par [address] [place] [address], [facility] [address]\[place] [address]\par

## 2020-11-22 NOTE — HISTORY OF PRESENT ILLNESS
[FreeTextEntry1] : Sharath is a 30-year-old male who on 08/04/220 underwent left ureteroscopy with laser lithotripsy and stone basketing of a rather large distal left ureteral stone. He was requested to do a metabolic w/u which was done in September and for some reason it was only now that he is here for f/u\par \par Please note he has a rather high anxiety level and tells me now that he had some vague lower abdominal discomfort last week but nothing now. I noticed when we were treating him that his pain threshold was extremely low and his concern that things are going wrong was extremely high making it hard for me to determine if his complaints are really something I need to pursue or whether reassurance will suffice. Regardless at this point in time he is not having any pain and tells me he’s been hyper hydrating himself and plans to do that forever.\par  [None] : no symptoms

## 2020-11-22 NOTE — LETTER BODY
[Dear  ___] : Dear  [unfilled], [Courtesy Letter:] : I had the pleasure of seeing your patient, [unfilled], in my office today. [Please see my note below.] : Please see my note below. [Sincerely,] : Sincerely, [FreeTextEntry2] : Oleksandr Ratliff MD\par [address]\[place] [address]\par

## 2021-01-22 ENCOUNTER — APPOINTMENT (OUTPATIENT)
Dept: UROLOGY | Facility: CLINIC | Age: 31
End: 2021-01-22
Payer: COMMERCIAL

## 2021-01-22 ENCOUNTER — TRANSCRIPTION ENCOUNTER (OUTPATIENT)
Age: 31
End: 2021-01-22

## 2021-01-22 VITALS
WEIGHT: 118 LBS | BODY MASS INDEX: 20.14 KG/M2 | DIASTOLIC BLOOD PRESSURE: 41 MMHG | TEMPERATURE: 98.9 F | SYSTOLIC BLOOD PRESSURE: 104 MMHG | HEIGHT: 64 IN | HEART RATE: 79 BPM

## 2021-01-22 PROCEDURE — 99213 OFFICE O/P EST LOW 20 MIN: CPT

## 2021-01-22 PROCEDURE — 99072 ADDL SUPL MATRL&STAF TM PHE: CPT

## 2021-01-22 NOTE — HISTORY OF PRESENT ILLNESS
[FreeTextEntry1] : Sharath  is a 30-year-old male status post left ureteroscopy laser lithotripsy after indwelling stent back in August 2020. He did a metabolic workup which had numerous abnormalities we may dietary recommendations he did a repeat on December 31 and is here for review. He tells me since last seen he’s been feeling fine he has no pain no trouble voiding no dysuria etc. [None] : no symptoms

## 2021-01-22 NOTE — LETTER HEADER
[FreeTextEntry3] : Krysta Conde M.D.\par Director of Urology\par [facility]\par [address] [place] [address], [address]\[place] [address]

## 2021-01-22 NOTE — LETTER BODY
[Dear  ___] : Dear  [unfilled], [Courtesy Letter:] : I had the pleasure of seeing your patient, [unfilled], in my office today. [Please see my note below.] : Please see my note below. [Sincerely,] : Sincerely, [FreeTextEntry2] : Oleksandr Ratliff MD\par [address]\[place] [address]\par

## 2021-01-22 NOTE — ASSESSMENT
[FreeTextEntry1] : Physical exam is benign metabolic workup is still poor and I don’t believe is going to keep up the volume. We going to send him for nutrition consult. After they are done maximizing what they can with him we will see where it lies and if the metabolic workup is still poor will send them to a nephrologist. It is almost 6 months since the surgery once we maximize his nutrition optimization will get a repeat ultrasound and see where it goes.

## 2021-03-02 ENCOUNTER — EMERGENCY (EMERGENCY)
Facility: HOSPITAL | Age: 31
LOS: 0 days | Discharge: HOME | End: 2021-03-02
Attending: EMERGENCY MEDICINE | Admitting: EMERGENCY MEDICINE
Payer: COMMERCIAL

## 2021-03-02 VITALS
TEMPERATURE: 97 F | OXYGEN SATURATION: 99 % | SYSTOLIC BLOOD PRESSURE: 108 MMHG | HEIGHT: 64 IN | HEART RATE: 80 BPM | RESPIRATION RATE: 20 BRPM | WEIGHT: 125 LBS | DIASTOLIC BLOOD PRESSURE: 79 MMHG

## 2021-03-02 VITALS
RESPIRATION RATE: 18 BRPM | DIASTOLIC BLOOD PRESSURE: 72 MMHG | HEART RATE: 78 BPM | SYSTOLIC BLOOD PRESSURE: 113 MMHG | OXYGEN SATURATION: 99 % | TEMPERATURE: 98 F

## 2021-03-02 DIAGNOSIS — E78.00 PURE HYPERCHOLESTEROLEMIA, UNSPECIFIED: ICD-10-CM

## 2021-03-02 DIAGNOSIS — R10.31 RIGHT LOWER QUADRANT PAIN: ICD-10-CM

## 2021-03-02 DIAGNOSIS — R31.9 HEMATURIA, UNSPECIFIED: ICD-10-CM

## 2021-03-02 DIAGNOSIS — Z79.899 OTHER LONG TERM (CURRENT) DRUG THERAPY: ICD-10-CM

## 2021-03-02 DIAGNOSIS — Z87.442 PERSONAL HISTORY OF URINARY CALCULI: ICD-10-CM

## 2021-03-02 LAB
ALBUMIN SERPL ELPH-MCNC: 4.9 G/DL — SIGNIFICANT CHANGE UP (ref 3.5–5.2)
ALP SERPL-CCNC: 93 U/L — SIGNIFICANT CHANGE UP (ref 30–115)
ALT FLD-CCNC: 25 U/L — SIGNIFICANT CHANGE UP (ref 0–41)
ANION GAP SERPL CALC-SCNC: 11 MMOL/L — SIGNIFICANT CHANGE UP (ref 7–14)
APPEARANCE UR: CLEAR — SIGNIFICANT CHANGE UP
AST SERPL-CCNC: 29 U/L — SIGNIFICANT CHANGE UP (ref 0–41)
BACTERIA # UR AUTO: NEGATIVE — SIGNIFICANT CHANGE UP
BILIRUB SERPL-MCNC: 0.4 MG/DL — SIGNIFICANT CHANGE UP (ref 0.2–1.2)
BILIRUB UR-MCNC: NEGATIVE — SIGNIFICANT CHANGE UP
BUN SERPL-MCNC: 11 MG/DL — SIGNIFICANT CHANGE UP (ref 10–20)
CALCIUM SERPL-MCNC: 9.4 MG/DL — SIGNIFICANT CHANGE UP (ref 8.5–10.1)
CHLORIDE SERPL-SCNC: 102 MMOL/L — SIGNIFICANT CHANGE UP (ref 98–110)
CO2 SERPL-SCNC: 24 MMOL/L — SIGNIFICANT CHANGE UP (ref 17–32)
COLOR SPEC: SIGNIFICANT CHANGE UP
CREAT SERPL-MCNC: 1 MG/DL — SIGNIFICANT CHANGE UP (ref 0.7–1.5)
DIFF PNL FLD: ABNORMAL
EPI CELLS # UR: 0 /HPF — SIGNIFICANT CHANGE UP (ref 0–5)
GLUCOSE SERPL-MCNC: 102 MG/DL — HIGH (ref 70–99)
GLUCOSE UR QL: NEGATIVE — SIGNIFICANT CHANGE UP
HCT VFR BLD CALC: 42.8 % — SIGNIFICANT CHANGE UP (ref 42–52)
HGB BLD-MCNC: 14.6 G/DL — SIGNIFICANT CHANGE UP (ref 14–18)
HYALINE CASTS # UR AUTO: 0 /LPF — SIGNIFICANT CHANGE UP (ref 0–7)
KETONES UR-MCNC: NEGATIVE — SIGNIFICANT CHANGE UP
LACTATE SERPL-SCNC: 1.8 MMOL/L — SIGNIFICANT CHANGE UP (ref 0.7–2)
LEUKOCYTE ESTERASE UR-ACNC: NEGATIVE — SIGNIFICANT CHANGE UP
LIDOCAIN IGE QN: 36 U/L — SIGNIFICANT CHANGE UP (ref 7–60)
MCHC RBC-ENTMCNC: 28.3 PG — SIGNIFICANT CHANGE UP (ref 27–31)
MCHC RBC-ENTMCNC: 34.1 G/DL — SIGNIFICANT CHANGE UP (ref 32–37)
MCV RBC AUTO: 82.9 FL — SIGNIFICANT CHANGE UP (ref 80–94)
NITRITE UR-MCNC: NEGATIVE — SIGNIFICANT CHANGE UP
NRBC # BLD: 0 /100 WBCS — SIGNIFICANT CHANGE UP (ref 0–0)
PH UR: 6.5 — SIGNIFICANT CHANGE UP (ref 5–8)
PLATELET # BLD AUTO: 269 K/UL — SIGNIFICANT CHANGE UP (ref 130–400)
POTASSIUM SERPL-MCNC: 5.9 MMOL/L — HIGH (ref 3.5–5)
POTASSIUM SERPL-SCNC: 5.9 MMOL/L — HIGH (ref 3.5–5)
PROT SERPL-MCNC: 7.5 G/DL — SIGNIFICANT CHANGE UP (ref 6–8)
PROT UR-MCNC: NEGATIVE — SIGNIFICANT CHANGE UP
RBC # BLD: 5.16 M/UL — SIGNIFICANT CHANGE UP (ref 4.7–6.1)
RBC # FLD: 11.9 % — SIGNIFICANT CHANGE UP (ref 11.5–14.5)
RBC CASTS # UR COMP ASSIST: 5 /HPF — HIGH (ref 0–4)
SODIUM SERPL-SCNC: 137 MMOL/L — SIGNIFICANT CHANGE UP (ref 135–146)
SP GR SPEC: 1.01 — SIGNIFICANT CHANGE UP (ref 1.01–1.03)
UROBILINOGEN FLD QL: SIGNIFICANT CHANGE UP
WBC # BLD: 6.87 K/UL — SIGNIFICANT CHANGE UP (ref 4.8–10.8)
WBC # FLD AUTO: 6.87 K/UL — SIGNIFICANT CHANGE UP (ref 4.8–10.8)
WBC UR QL: 2 /HPF — SIGNIFICANT CHANGE UP (ref 0–5)

## 2021-03-02 PROCEDURE — 93010 ELECTROCARDIOGRAM REPORT: CPT

## 2021-03-02 PROCEDURE — 74176 CT ABD & PELVIS W/O CONTRAST: CPT | Mod: 26

## 2021-03-02 PROCEDURE — 99285 EMERGENCY DEPT VISIT HI MDM: CPT

## 2021-03-02 RX ORDER — SODIUM CHLORIDE 9 MG/ML
1000 INJECTION INTRAMUSCULAR; INTRAVENOUS; SUBCUTANEOUS ONCE
Refills: 0 | Status: COMPLETED | OUTPATIENT
Start: 2021-03-02 | End: 2021-03-02

## 2021-03-02 RX ADMIN — SODIUM CHLORIDE 1000 MILLILITER(S): 9 INJECTION INTRAMUSCULAR; INTRAVENOUS; SUBCUTANEOUS at 17:17

## 2021-03-02 NOTE — ED PROVIDER NOTE - CLINICAL SUMMARY MEDICAL DECISION MAKING FREE TEXT BOX
pt evaluated for right sided flank pain, labs unremarkable with hemolyzed K, UA slight hematuria. Pt comfortable in ED. CT A/P without acute pathology, normal appendix. Advised close follow up with PMD and urology as likely passed stone and pt agreed. Strict return precautions advised and pt verbalized understanding.

## 2021-03-02 NOTE — ED PROVIDER NOTE - NS ED ROS FT
Review of Systems:  	•	CONSTITUTIONAL - no fever, no diaphoresis, no chills  	•	SKIN - no rash  	•	HEMATOLOGIC - no bleeding, no bruising  	•	EYES - no eye pain, no blurry vision  	•	ENT - no change in hearing, no sore throat, no ear pain or tinnitus  	•	RESPIRATORY - no shortness of breath, no cough  	•	CARDIAC - no chest pain, no palpitations  	•	GI - + right flank pain, no nausea, no vomiting, no diarrhea, no constipation  	•	GENITO-URINARY - no discharge, no dysuria; no hematuria, no increased urinary frequency  	•	MUSCULOSKELETAL - no joint paint, no swelling, no redness  	•	NEUROLOGIC - no weakness, no headache, no paresthesias, no LOC  	•	PSYCH - no anxiety, non suicidal, non homicidal, no hallucination, no depression

## 2021-03-02 NOTE — ED PROVIDER NOTE - PATIENT PORTAL LINK FT
You can access the FollowMyHealth Patient Portal offered by [facility] by registering at the following website: [email or web address]. By joining [facility]’s FollowMyHealth portal, you will also be able to view your health information using other applications (apps) compatible with our system.

## 2021-03-02 NOTE — ED PROVIDER NOTE - PHYSICAL EXAMINATION
CONST: Well appearing in NAD  EYES: PERRL, EOMI, Sclera and conjunctiva clear.   ENT: No nasal discharge. Oropharynx normal appearing, no erythema or exudates. No abscess or swelling. Uvula midline. No temporal artery or mastoid tenderness.  NECK: Non-tender, no meningeal signs. normal ROM. supple   CARD: Normal S1 S2; Normal rate and rhythm  RESP: Equal BS B/L, No wheezes, rhonchi or rales. No distress  GI: Soft, non-tender, non-distended. no cva tenderness. normal BS  MS: Normal ROM in all extremities. No midline spinal tenderness. pulses 2 +. no calf tenderness or swelling  SKIN: Warm, dry, no acute rashes. Good turgor  NEURO: A&Ox3, No focal deficits.

## 2021-03-02 NOTE — ED PROVIDER NOTE - CARE PROVIDER_API CALL
Oleksandr Ratliff  INTERNAL MEDICINE  [address] [place] [address]  Phone: [phone]  Fax: [phone]  Established Patient  Follow Up Time: Routine    Krysta Conde)  Urology  [address] [place] [address], [address]  [place] [address]  Phone: [phone]  Fax: [phone]  Established Patient  Follow Up Time: Routine

## 2021-03-02 NOTE — ED PROVIDER NOTE - CARE PROVIDERS DIRECT ADDRESSES
[email or web address]

## 2021-03-02 NOTE — ED PROVIDER NOTE - ATTENDING CONTRIBUTION TO CARE
31 yo male with PMH nephrolithiasis with hx stent placement presents c/o right sided flank pain x 2-3 days. Pt states pain originates in flank and radiates to RLQ and groin. Yesterday pain was colicky and was worse. Denies any N/V/D, hematuria, dysuria, testicular pain, swelling, fevers or chills. No CP, SOB, trauma. Tolerating PO as usual. Urologist is Dr. Martinez.    VITAL SIGNS: noted  CONSTITUTIONAL: Well-developed; well-nourished; in no acute distress  HEAD: Normocephalic; atraumatic  EYES: PERRL, EOM intact; conjunctiva and sclera clear  ENT: No nasal discharge; airway clear. MMM  NECK: Supple; non tender.    CARD: S1, S2 normal; no murmurs, gallops, or rubs. Regular rate and rhythm  RESP: CTAB/L, no wheezes, rales or rhonchi  ABD: Normal bowel sounds; soft; non-distended; non-tender; no hepatosplenomegaly. +right CVA tenderness  EXT: Normal ROM. No calf tenderness or edema. Distal pulses intact  NEURO: Alert, oriented. Grossly unremarkable. No focal deficits  SKIN: Skin exam is warm and dry

## 2021-03-02 NOTE — ED PROVIDER NOTE - NSFOLLOWUPINSTRUCTIONS_ED_ALL_ED_FT
FOLLOW UP WITH YOUR DOCTOR THIS WEEK FOR REEVALUATION. ADVISE UROLOGY FOLLOW UP AS WELL.    RETURN TO ED IMMEDIATELY WITH ANY WORSENING SYMPTOMS, CHEST PAIN, SHORTNESS OF BREATH, FEVERS, WEAKNESS, DIZZINESS, PERSISTENT OR SEVERE HEADACHE OR ANY OTHER CONCERNS.     Abdominal Pain    Many things can cause abdominal pain. Many times, abdominal pain is not caused by a disease and will improve without treatment. Your health care provider will do a physical exam to determine if there is a dangerous cause of your pain; blood tests and imaging may help determine the cause of your pain. However, in many cases, no cause may be found and you may need further testing as an outpatient. Monitor your abdominal pain for any changes.     SEEK IMMEDIATE MEDICAL CARE IF YOU HAVE ANY OF THE FOLLOWING SYMPTOMS: worsening abdominal pain, uncontrollable vomiting, profuse diarrhea, inability to have bowel movements or pass gas, black or bloody stools, fever accompanying chest pain or back pain, or fainting. These symptoms may represent a serious problem that is an emergency. Do not wait to see if the symptoms will go away. Get medical help right away. Call 911 and do not drive yourself to the hospital.

## 2021-03-02 NOTE — ED PROVIDER NOTE - PROVIDER TOKENS
PROVIDER:[TOKEN:[[id number]],FOLLOWUP:[Routine],ESTABLISHEDPATIENT:[T]],PROVIDER:[TOKEN:[[id number]],FOLLOWUP:[Routine],ESTABLISHEDPATIENT:[T]]

## 2021-03-02 NOTE — ED PROVIDER NOTE - OBJECTIVE STATEMENT
30 year old male with no pmhx presents with right flank pain x 2 days. pain radiating to right groin. mild hematuria. no fever, chills, nausea, vomiting, diarrhea, testicular pain or swelling, dysuria or frequency.

## 2021-03-03 LAB
CULTURE RESULTS: SIGNIFICANT CHANGE UP
SPECIMEN SOURCE: SIGNIFICANT CHANGE UP

## 2021-03-08 ENCOUNTER — NON-APPOINTMENT (OUTPATIENT)
Age: 31
End: 2021-03-08

## 2021-03-08 NOTE — ASU PATIENT PROFILE, ADULT - TEACHING/LEARNING EDUCATIONAL LEVEL
[facility] Nsaids Pregnancy And Lactation Text: These medications are considered safe up to 30 weeks gestation. It is excreted in breast milk.

## 2021-03-18 ENCOUNTER — APPOINTMENT (OUTPATIENT)
Dept: UROLOGY | Facility: CLINIC | Age: 31
End: 2021-03-18
Payer: COMMERCIAL

## 2021-03-18 VITALS
WEIGHT: 127 LBS | HEIGHT: 64 IN | HEART RATE: 75 BPM | BODY MASS INDEX: 21.68 KG/M2 | SYSTOLIC BLOOD PRESSURE: 111 MMHG | DIASTOLIC BLOOD PRESSURE: 60 MMHG | TEMPERATURE: 97.6 F

## 2021-03-18 DIAGNOSIS — N20.1 CALCULUS OF URETER: ICD-10-CM

## 2021-03-18 DIAGNOSIS — R10.9 UNSPECIFIED ABDOMINAL PAIN: ICD-10-CM

## 2021-03-18 PROCEDURE — 99214 OFFICE O/P EST MOD 30 MIN: CPT

## 2021-03-18 PROCEDURE — 99072 ADDL SUPL MATRL&STAF TM PHE: CPT

## 2021-03-18 NOTE — ASSESSMENT
[FreeTextEntry1] : I personally reviewed the CAT scan with the physicians assistant and though theoretically could have passed the stone I see no evidence but, there was no conversation dilatation etc. However once again I have Strongly recommend he follow up with both nephrology and nutrition as it is possible that he passed a small stone. His CT scan does not demonstrate any further calculi bilaterally and as he does not want to make any doing to the future assistance from these other subspecialist would be of help. Though he told us he would do with the last time he said he is doing at this time I think this time he means it is he got another scare.\par \par I don't know if this flank pain is musculoskeletal or kidney stones and obviously it be a major difference between a pulled muscle and recurrent stones. I also don't know if he will follow-up with the subspecialist as requested in this would make a major impact on his life as her current stone formation given that the one we know we definitely had resulted in pyonephrosis and two operations, is significant. I'm not ordering any new tests at this point in time, As we will see what the subspecialist want and I don't want to double step

## 2021-03-18 NOTE — LETTER BODY
[Dear  ___] : Dear  [unfilled], [Courtesy Letter:] : I had the pleasure of seeing your patient, [unfilled], in my office today. [Please see my note below.] : Please see my note below. [Sincerely,] : Sincerely, [FreeTextEntry2] : Oleksandr Ratliff MD\par [address] [place] [address]\[place] [address]\par

## 2021-03-18 NOTE — HISTORY OF PRESENT ILLNESS
[None] : no symptoms [FreeTextEntry1] : Sharath  is a 30-year-old male status post left ureteroscopy laser lithotripsy in August 2020. When he was last seen on January 22, 2021 we had strongly recommended followup with nephrology secondary to multiple abnormalities on metabolic workup. He did not do so. He presents today for evaluation after an emergency room visit for right-sided flank/lower quadrant pain.\par \par On 3/2/21 patient presented to the hospital complaining of right-sided flank pain. Denies any association with nausea/vomiting, chills, fever, lightheadedness, dizziness, or further urological changes.\par \par They thought that he might have passed a stone. His pain resolved and he was discharged. He presents today for evaluation. He denies any further episodes of renal colic, flank pain, changes in urination/gross hematuria\par \par He is extremely anxious that this might indeed have been a stone and that he may make new ones in the future. He found the last experience quite disconcerting.

## 2021-03-18 NOTE — LETTER HEADER
[FreeTextEntry3] : Krysta Conde M.D.\par Director of Urology\par [facility]\par [address] [place] [address], [address]\[place] [address]

## 2021-04-13 ENCOUNTER — EMERGENCY (EMERGENCY)
Facility: HOSPITAL | Age: 31
LOS: 0 days | Discharge: LEFT BEFORE DISCHARGE | End: 2021-04-13
Admitting: EMERGENCY MEDICINE
Payer: COMMERCIAL

## 2021-04-13 VITALS
WEIGHT: 128.09 LBS | TEMPERATURE: 98 F | SYSTOLIC BLOOD PRESSURE: 119 MMHG | HEART RATE: 81 BPM | OXYGEN SATURATION: 98 % | RESPIRATION RATE: 18 BRPM | DIASTOLIC BLOOD PRESSURE: 83 MMHG | HEIGHT: 64 IN

## 2021-04-13 DIAGNOSIS — R10.9 UNSPECIFIED ABDOMINAL PAIN: ICD-10-CM

## 2021-04-13 DIAGNOSIS — Z53.21 PROCEDURE AND TREATMENT NOT CARRIED OUT DUE TO PATIENT LEAVING PRIOR TO BEING SEEN BY HEALTH CARE PROVIDER: ICD-10-CM

## 2021-04-13 PROCEDURE — L9991: CPT

## 2021-04-14 ENCOUNTER — EMERGENCY (EMERGENCY)
Facility: HOSPITAL | Age: 31
LOS: 0 days | Discharge: HOME | End: 2021-04-14
Attending: EMERGENCY MEDICINE | Admitting: EMERGENCY MEDICINE
Payer: COMMERCIAL

## 2021-04-14 VITALS
SYSTOLIC BLOOD PRESSURE: 112 MMHG | DIASTOLIC BLOOD PRESSURE: 64 MMHG | TEMPERATURE: 98 F | OXYGEN SATURATION: 100 % | HEIGHT: 63 IN | WEIGHT: 125 LBS | HEART RATE: 64 BPM | RESPIRATION RATE: 18 BRPM
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
## 2021-04-14 DIAGNOSIS — R10.9 UNSPECIFIED ABDOMINAL PAIN: ICD-10-CM

## 2021-04-14 DIAGNOSIS — E78.5 HYPERLIPIDEMIA, UNSPECIFIED: ICD-10-CM

## 2021-04-14 DIAGNOSIS — M25.551 PAIN IN RIGHT HIP: ICD-10-CM

## 2021-04-14 DIAGNOSIS — S76.211A STRAIN OF ADDUCTOR MUSCLE, FASCIA AND TENDON OF RIGHT THIGH, INITIAL ENCOUNTER: ICD-10-CM

## 2021-04-14 DIAGNOSIS — Z87.442 PERSONAL HISTORY OF URINARY CALCULI: ICD-10-CM
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
## 2021-04-14 DIAGNOSIS — E78.00 PURE HYPERCHOLESTEROLEMIA, UNSPECIFIED: ICD-10-CM

## 2021-04-14 PROCEDURE — 99283 EMERGENCY DEPT VISIT LOW MDM: CPT

## 2021-04-14 NOTE — ED PROVIDER NOTE - NS ED ROS FT
CONST: No fever, chills or bodyaches  CARD: No chest pain, palpitations  RESP: No SOB, cough  GI: No abdominal pain, N/V/D  MS: see HPI  SKIN: No rashes  NEURO: No headache, dizziness, paresthesias or LOC

## 2021-04-14 NOTE — ED PROVIDER NOTE - NSFOLLOWUPCLINICS_GEN_ALL_ED_FT
[facility] ([facility])  Rehabilitation  [facility] [address], [address] [place] [address]  Phone: [phone]  Fax:

## 2021-04-14 NOTE — ED PROVIDER NOTE - WET READ LAUNCH FT
76 y/o F c/o left arm pain, pt states difficult to lift arm. Pt came to ED s/p having a blood pressure 255/80 this morning on home machine. Pt denies chest pain or sob at this time. Pt has a hx of COPD.
There are no Wet Read(s) to document.

## 2021-04-14 NOTE — ED ADULT NURSE NOTE - NS PRO AD ANY ON CHART
Dottie Pastrana MD   Yes

## 2021-04-14 NOTE — ED PROVIDER NOTE - ATTENDING CONTRIBUTION TO CARE
29yo M with PMHx HLD, kidney stone, presents for right hip pain radiating to right lower back and to groin and anterior thigh for several weeks, only occurs when walking, currently pain free at rest in stretcher in ED. Patient states he works at [employer], stands for prolonged hours with heavy lifting at work. Patient was seen with similar symptoms in ED 3/2/2021, had negative CT a/p at the time with unremarkable ED workup. Denies fever, headache, lightheadedness, CP, SOB, cough, nausea, vomiting, diarrhea, dysuria, hematuria, leg swelling. Denies testicular pain or swelling.     Vital signs reviewed  GENERAL: Patient nontoxic appearing, NAD  HEAD: NCAT  EYES: Anicteric  ENT: MMM  RESPIRATORY: Normal respiratory effort. CTA B/L. No wheezing, rales, rhonchi  CARDIOVASCULAR: Regular rate and rhythm  ABDOMEN: Soft. Nondistended. Nontender. No guarding or rebound. No CVA tenderness.  MUSCULOSKELETAL/EXTREMITIES: Brisk cap refill. FROM BLE, no focal TTP. No leg edema.  SKIN:  Warm and dry  NEURO: AAOx3. No gross FND.

## 2021-04-14 NOTE — ED PROVIDER NOTE - PHYSICAL EXAMINATION
CONST: Well appearing in NAD  CARD: Normal S1 S2; Normal rate and rhythm  RESP: Equal BS B/L, No wheezes, rhonchi or rales. No distress  GI: Soft, non-tender, non-distended.  MS: Normal ROM in all extremities. No midline spinal tenderness.  SKIN: Warm, dry, no acute rashes. Good turgor  NEURO: A&Ox3, No focal deficits. Strength 5/5 with no sensory deficits.

## 2021-04-14 NOTE — ED PROVIDER NOTE - OBJECTIVE STATEMENT
29yo male with PMHx L renal stone s/p stent one year prior presents c/o R hip/groin pain intermittent, only when walking, x6wks, initially resolved, then began 2 days prior with radiation anteriorly down thigh, self-resolving. Pt notes stands prolonged hours and participates in heavy lifting at [employer] daily for employment. Was seen in ED prior in March for similar, underwent CT abdomen, negative, unremarkable for any stones.

## 2021-04-14 NOTE — ED PROVIDER NOTE - PATIENT PORTAL LINK FT
You can access the FollowMyHealth Patient Portal offered by [facility] by registering at the following website: [email or web address]. By joining [employer]’s FollowMyHealth portal, you will also be able to view your health information using other applications (apps) compatible with our system.

## 2021-04-14 NOTE — ED PROVIDER NOTE - CLINICAL SUMMARY MEDICAL DECISION MAKING FREE TEXT BOX
29yo M presents for right hip pain. Ambulatory, full ROM at rest, onset of pain after hours of working at physically intensive job. Pain likely MSK in origin, less likely acute intraabdominal pathology, patient had recent workup for same. Discussed risks/ benefits of repeat workup (ex. CT), patient involved in decision making process, agrees to f/u with ortho/ rehab. Return precautions given.

## 2021-04-14 NOTE — ED PROVIDER NOTE - NSFOLLOWUPINSTRUCTIONS_ED_ALL_ED_FT
HIP PAIN - AfterCare(R) Instructions(ER/ED)           Hip Pain    WHAT YOU NEED TO KNOW:    Hip pain can be caused by a number of conditions, such as bursitis, arthritis, or muscle or tendon strain. You may have swelling in the fluid-filled sacs that protect your muscles and tendons. Hip pain can also be caused by a lower back problem. Hip pain may be caused by trauma, playing sports, or running. Pain may start in your hip and go to your thigh, buttock, or groin.    Hip and Pelvis         DISCHARGE INSTRUCTIONS:    Medicines:   •NSAIDs, such as ibuprofen, help decrease swelling, pain, and fever. This medicine is available with or without a doctor's order. NSAIDs can cause stomach bleeding or kidney problems in certain people. If you take blood thinner medicine, always ask your healthcare provider if NSAIDs are safe for you. Always read the medicine label and follow directions.      •Take your medicine as directed. Contact your healthcare provider if you think your medicine is not helping or if you have side effects. Tell him of her if you are allergic to any medicine. Keep a list of the medicines, vitamins, and herbs you take. Include the amounts, and when and why you take them. Bring the list or the pill bottles to follow-up visits. Carry your medicine list with you in case of an emergency.      Return to the emergency department if:   •Your pain gets worse.      •You have numbness in your leg or toes.      •You cannot put any weight on or move your hip.      Contact your healthcare provider if:   •You have a fever.      •Your pain does not decrease, even after treatment.      •You have questions or concerns about your condition or care.      Follow up with your healthcare provider as directed: You may need physical therapy, an injection, or more testing. You may need to see an orthopedic specialist. Write down your questions so you remember to ask them during your visits.    Manage your hip pain:   •Rest your injured hip so that it can heal. You may need to avoid putting any weight on your hip for at least 48 hours. Return to normal activities as directed.      •Ice the injury for 20 minutes every 4 hours, or as directed. Use an ice pack, or put crushed ice in a plastic bag. Cover it with a towel to protect your skin. Ice helps prevent tissue damage and decreases swelling and pain.      •Elevate your injured hip above the level of your heart as often as you can. This will help decrease swelling and pain. If possible, prop your hip and leg on pillows or blankets to keep the area elevated comfortably.       •Maintain a healthy weight. Extra body weight can cause pressure and pain in your hip, knee, and ankle joints. Ask your healthcare provider how much you should weigh. Ask him or her to help you create a weight loss plan if you are overweight.      •Use assistive devices as directed. You may need to use a cane or crutches. Assistive devices help decrease pain and pressure on your hip when you walk. Ask your healthcare provider for more information about assistive devices and how to use them correctly.

## 2024-10-17 NOTE — ASU PREOP CHECKLIST - NS PREOP CHK MONITOR ANESTHESIA CONSENT
Take the antibiotic with food.  Eat yogurt or take probiotic once a day.  Symptoms should improve in 2 to 3 days.   Wash your hands often, especially after coughing or touching your nose or mouth.  Use disposable tissues instead of handkerchiefs.  Increase fluid intake and rest as needed.  Take Tylenol and/or ibuprofen as needed for aches and pain and/or fever.  Return or follow-up with primary care provider if symptoms did not improve.  Call 911 or go to the nearest emergency room if symptoms became severe such as fever of 102.5 degrees Fahrenheit or 39.2 degrees Celsius, severe pain, shortness of breath, chest tightness.   
done

## 2025-03-18 NOTE — ASU PATIENT PROFILE, ADULT - PRO INTERPRETER NEED 2
scar with preserved EF  Continue aspirin, high potency statin and beta-blocker  No angina stable    2. Mixed hyperlipidemia  LDL is less than 55 is the goal , denies excess muscle aches or new liver issues  Lipid Lowering Medications       HMG CoA Reductase Inhibitors       rosuvastatin (CRESTOR) 40 MG tablet Take 1 tablet by mouth daily       Intestinal Cholesterol Absorption Inhibitors       ezetimibe (ZETIA) 10 MG tablet TAKE 1 TABLET BY MOUTH EVERY DAY           No results found for: \"LDL\", \"LDLDIRECT\"      3. Primary hypertension  At goal , meds and possible side effects reviewed and patient denies  Hypertension Medications       Calcium Channel Blockers       amLODIPine (NORVASC) 10 MG tablet Take 1 tablet by mouth daily       Angiotensin II Receptor Antagonists       losartan (COZAAR) 50 MG tablet Take 1 tablet by mouth daily           BP Readings from Last 6 Encounters:   25 138/88   24 108/60   23 131/83   23 118/72   23 (!) 154/90   23 128/82                 Impression:   1. Coronary artery disease involving native coronary artery of native heart without angina pectoris    2. Mixed hyperlipidemia    3. Primary hypertension        Cardiac History:   Surgical history of prostate cancer 2020 with brachytherapy [facility], carpal tunnel surgery 2022 and elbow release at [facility] back operation previously prior cath  at [facility] with clot Buster  Allergy to latex  Quit smoking in  smoked for 15 years does use alcohol sometimes to the point of abuse.  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  Family history mother is 91 in fair health Father  of pneumonia at 93 3 siblings 1 of which is a sister who has diabete   23    TRANSTHORACIC ECHOCARDIOGRAM (TTE) COMPLETE (CONTRAST/BUBBLE/3D PRN) 2023  1:29 PM, 2023 12:00 AM 
English